# Patient Record
Sex: MALE | Race: BLACK OR AFRICAN AMERICAN | URBAN - METROPOLITAN AREA
[De-identification: names, ages, dates, MRNs, and addresses within clinical notes are randomized per-mention and may not be internally consistent; named-entity substitution may affect disease eponyms.]

---

## 2023-03-19 ENCOUNTER — OFFICE VISIT (OUTPATIENT)
Dept: URGENT CARE | Age: 48
End: 2023-03-19

## 2023-03-19 VITALS
RESPIRATION RATE: 18 BRPM | HEIGHT: 68 IN | OXYGEN SATURATION: 97 % | DIASTOLIC BLOOD PRESSURE: 82 MMHG | WEIGHT: 269 LBS | TEMPERATURE: 97.7 F | HEART RATE: 75 BPM | SYSTOLIC BLOOD PRESSURE: 122 MMHG | BODY MASS INDEX: 40.77 KG/M2

## 2023-03-19 DIAGNOSIS — J30.9 ALLERGIC SINUSITIS: ICD-10-CM

## 2023-03-19 DIAGNOSIS — R05.8 ALLERGIC COUGH: Primary | ICD-10-CM

## 2023-03-19 RX ORDER — PREDNISONE 20 MG/1
TABLET ORAL
Qty: 8 TABLET | Refills: 0 | Status: SHIPPED | OUTPATIENT
Start: 2023-03-19

## 2023-03-19 RX ORDER — LEVOCETIRIZINE DIHYDROCHLORIDE 5 MG/1
5 TABLET, FILM COATED ORAL DAILY
Qty: 30 TABLET | Refills: 1 | Status: SHIPPED | OUTPATIENT
Start: 2023-03-19

## 2023-03-19 RX ORDER — HYDROXYZINE 25 MG/1
25 TABLET, FILM COATED ORAL
COMMUNITY
Start: 2023-03-02

## 2023-03-19 RX ORDER — DICLOFENAC SODIUM 10 MG/G
GEL TOPICAL
COMMUNITY
Start: 2023-02-17

## 2023-03-19 NOTE — PROGRESS NOTES
Subjective: (As above and below)     The patient/guardian gave verbal consent to treat. Chief Complaint   Patient presents with    Cold Symptoms     Pt here today with complaint of cough and congestion x4 days. Reports some associated headaches and wheezing. Denies any sore throat, fevers, chills, or body aches. Reports history of pneumonia. Alondra Lindsay is a 51 y.o. male who presents for evaluation of : nasal congestion, cough, some wheezing. Symptom onset 3-4 days ago. No aches or fevers. Good energy . Preceding illness: none. No other identified aggravating or alleviating factors. Symptoms are constant and overall unchanged. Denies: SOB, vomiting/diarrhea, rashes, fevers . Known Exposure to COVID-19: no      ROS  Review of Systems - negative except as listed above    Reviewed PmHx, RxHx, FmHx, SocHx, AllgHx and updated in chart. History reviewed. No pertinent family history. History reviewed. No pertinent past medical history. Social History     Socioeconomic History    Marital status:    Tobacco Use    Smoking status: Never    Smokeless tobacco: Never          Current Outpatient Medications   Medication Sig    hydrOXYzine HCL (ATARAX) 25 mg tablet 25 mg.    diclofenac (VOLTAREN) 1 % gel APPLY 4 GM TO AFFECTED AREA FOUR TIMES A DAY AS NEEDED FOR KNEE PAIN    levocetirizine (XYZAL) 5 mg tablet Take 1 Tablet by mouth daily. predniSONE (DELTASONE) 20 mg tablet Take 2 tabs by mouth one time daily with food for 4 days. No current facility-administered medications for this visit. Objective:     Vitals:    03/19/23 1027   BP: 122/82   Pulse: 75   Resp: 18   Temp: 97.7 °F (36.5 °C)   SpO2: 97%   Weight: 269 lb (122 kg)   Height: 5' 8\" (1.727 m)       Physical Exam  General appearance - appears well hydrated and does not appear toxic, no acute distress  Eyes - EOMs intact. Non injected. No scleral icterus   Ears - no external swelling. TMs normal bilat.   Nose - nasal congestion, edematous pale nasal turbinates. No purulent drainage  Mouth - OP clear without swelling, exudate or lesion. Mucus membranes moist. Uvula midline. Neck/Lymphatics - trachea midline, full AROM, no LAD of neck  Chest - Normal breathing effort no rales, rhonchi or diminishments bilaterally. Few soft scattered wheezes bilat  Heart - RRR, no murmurs  Skin - no observable rashes or pallor  Neurologic- alert and oriented x 3  Psychiatric- normal mood, behavior and though content. Assessment/ Plan:     1. Allergic cough    - predniSONE (DELTASONE) 20 mg tablet; Take 2 tabs by mouth one time daily with food for 4 days. Dispense: 8 Tablet; Refill: 0    2. Allergic sinusitis    - levocetirizine (XYZAL) 5 mg tablet; Take 1 Tablet by mouth daily. Dispense: 30 Tablet; Refill: 1  - predniSONE (DELTASONE) 20 mg tablet; Take 2 tabs by mouth one time daily with food for 4 days. Dispense: 8 Tablet; Refill: 0      Likely allergies  Start xyzal  Albuterol for cough/wheeze on exam      Follow up: Follow up immediately for any new, worsening or changes or if symptoms are not improving over the next 5-7 days.          Marilu Martinez NP